# Patient Record
Sex: FEMALE | Race: OTHER | HISPANIC OR LATINO | URBAN - METROPOLITAN AREA
[De-identification: names, ages, dates, MRNs, and addresses within clinical notes are randomized per-mention and may not be internally consistent; named-entity substitution may affect disease eponyms.]

---

## 2024-01-22 ENCOUNTER — EMERGENCY (EMERGENCY)
Facility: HOSPITAL | Age: 24
LOS: 1 days | Discharge: HOME | End: 2024-01-22
Attending: STUDENT IN AN ORGANIZED HEALTH CARE EDUCATION/TRAINING PROGRAM
Payer: COMMERCIAL

## 2024-01-22 VITALS
TEMPERATURE: 98 F | DIASTOLIC BLOOD PRESSURE: 82 MMHG | WEIGHT: 185.19 LBS | SYSTOLIC BLOOD PRESSURE: 129 MMHG | RESPIRATION RATE: 16 BRPM | OXYGEN SATURATION: 97 % | HEART RATE: 99 BPM

## 2024-01-22 PROCEDURE — 99283 EMERGENCY DEPT VISIT LOW MDM: CPT | Mod: 25

## 2024-01-22 PROCEDURE — 90715 TDAP VACCINE 7 YRS/> IM: CPT

## 2024-01-22 PROCEDURE — 99284 EMERGENCY DEPT VISIT MOD MDM: CPT

## 2024-01-22 PROCEDURE — 90471 IMMUNIZATION ADMIN: CPT

## 2024-01-22 RX ORDER — TETANUS TOXOID, REDUCED DIPHTHERIA TOXOID AND ACELLULAR PERTUSSIS VACCINE, ADSORBED 5; 2.5; 8; 8; 2.5 [IU]/.5ML; [IU]/.5ML; UG/.5ML; UG/.5ML; UG/.5ML
0.5 SUSPENSION INTRAMUSCULAR ONCE
Refills: 0 | Status: COMPLETED | OUTPATIENT
Start: 2024-01-22 | End: 2024-01-22

## 2024-01-22 RX ORDER — FAMOTIDINE 10 MG/ML
20 INJECTION INTRAVENOUS ONCE
Refills: 0 | Status: DISCONTINUED | OUTPATIENT
Start: 2024-01-22 | End: 2024-01-23

## 2024-01-22 RX ORDER — SODIUM CHLORIDE 9 MG/ML
1000 INJECTION, SOLUTION INTRAVENOUS ONCE
Refills: 0 | Status: DISCONTINUED | OUTPATIENT
Start: 2024-01-22 | End: 2024-01-23

## 2024-01-22 RX ORDER — ONDANSETRON 8 MG/1
4 TABLET, FILM COATED ORAL ONCE
Refills: 0 | Status: DISCONTINUED | OUTPATIENT
Start: 2024-01-22 | End: 2024-01-23

## 2024-01-22 RX ORDER — ACETAMINOPHEN 500 MG
1000 TABLET ORAL ONCE
Refills: 0 | Status: DISCONTINUED | OUTPATIENT
Start: 2024-01-22 | End: 2024-01-23

## 2024-01-22 RX ADMIN — TETANUS TOXOID, REDUCED DIPHTHERIA TOXOID AND ACELLULAR PERTUSSIS VACCINE, ADSORBED 0.5 MILLILITER(S): 5; 2.5; 8; 8; 2.5 SUSPENSION INTRAMUSCULAR at 23:57

## 2024-01-22 NOTE — ED PROVIDER NOTE - CLINICAL SUMMARY MEDICAL DECISION MAKING FREE TEXT BOX
Liz: 23-year-old female with no pertinent past medical history presents with multiple medical complaints.  Patient states she was hiking yesterday, tripped on a rock, and fell injuring her right knee.  Denies head strike or LOC.  Patient ambulatory afterwards.  Patient also reports nausea and vomiting since this morning.  Denies any fevers, chest pain, shortness of breath, abdominal pain, diarrhea, dysuria, numbness, focal weakness, or rash.  Denies any history of abdominal surgeries in the past.  Patient attributes her vomiting to alcohol use this past weekend. Unknown last TDAP. Physical exam per above. Laceration >36 hours since injury, high risk of infection/poor healing if sutured, will allow to heal by secondary infection. Abdomen nontender. History and exam provide no evidence of pancreatitis, AAA, cholecystitis, choledocholithiasis, cholangitis, mesenteric ischemia, small bowel obstruction, diverticulitis, colitis, appendicitis, or pelvic etiology such as ovarian torsion or ectopic pregnancy. Will obtain labs, provide supportive treatment, PO trial with dispo pending workup.

## 2024-01-22 NOTE — ED PROVIDER NOTE - DATE/TIME 1
The patient called and stated that she received \"a disturbing VM from [facility] stating that they cannot send her prescription\". I asked the patient if she has made any changes to her clinical information. She said that she no longer uses [facility]. Her current pharmacy is [address] [place] [address]    She asks that Yajaira Page NP resend her 10/20/23 medication request to [address] [place] [address].     The below address is from the patient's bill, I let her know that there is most likely a difference in the addesses due to billin[address] [place] [address], [address] [place] [address] 23-Jan-2024 00:13

## 2024-01-22 NOTE — ED PROVIDER NOTE - PROGRESS NOTE DETAILS
Liz: pt seen and re-evaluated at bedside. Pt comfortable in NAD. Patient declining labs/meds, tolerating PO. Wound cleansed with NS and covered with dressing. Discussed PMD follow up, strict return precautions, pt understood and agreeable with plan.

## 2024-01-22 NOTE — ED PROVIDER NOTE - PATIENT PORTAL LINK FT
You can access the FollowMyHealth Patient Portal offered by [facility] by registering at the following website: [email or web address]. By joining [employer]’s FollowMyHealth portal, you will also be able to view your health information using other applications (apps) compatible with our system.

## 2024-01-22 NOTE — ED ADULT TRIAGE NOTE - CHIEF COMPLAINT QUOTE
I fell yesterday while hiking and I got laceration on my right leg and I have been vomiting and unable to keep anything down

## 2024-01-22 NOTE — ED PROVIDER NOTE - OBJECTIVE STATEMENT
23-year-old female with no pertinent past medical history presents with multiple medical complaints.  Patient states she was hiking yesterday, tripped on a rock, and fell injuring her right knee.  Denies head strike or LOC.  Patient ambulatory afterwards.  Patient also reports nausea and vomiting since this morning.  Denies any fevers, chest pain, shortness of breath, abdominal pain, diarrhea, dysuria, numbness, focal weakness, or rash.  Denies any history of abdominal surgeries in the past.  Patient attributes her vomiting to alcohol use this past weekend. Unknown last TDAP. Denies any additional complaints.

## 2024-01-22 NOTE — ED PROVIDER NOTE - PHYSICAL EXAMINATION
CONSTITUTIONAL: non-toxic, well appearing  SKIN: no rash, no petechiae.  EYES: pink conjunctiva, anicteric  HEENT: NC/AT  NECK: Supple; no meningismus, no JVD  CARD: RRR, no murmurs, equal radial pulses bilaterally 2+  RESP: CTAB, no respiratory distress  ABD: Soft, non-tender, non-distended, no peritoneal signs, no CVA tenderness  EXT: Normal ROM x4. No edema. Right knee linear laceration inferior to right knee, site C/D/I, no active bleeding, FROM knee.   NEURO: Alert, oriented. Neuro exam nonfocal, equal strength bilaterally  PSYCH: Cooperative, appropriate.

## 2024-01-22 NOTE — ED PROVIDER NOTE - NSFOLLOWUPINSTRUCTIONS_ED_ALL_ED_FT
Laceration    A laceration is a cut that goes through all of the layers of the skin and into the tissue that is right under the skin. Some lacerations heal on their own. Others need to be closed with skin adhesive strips, skin glue, stitches (sutures), or staples. Proper laceration care minimizes the risk of infection and helps the laceration to heal better.  If non-absorbable stitches or staples have been placed, they must be taken out within the time frame instructed by your healthcare provider.    Take over the counter acetaminophen (Tylenol) 650-1000 mg every 4-6 hours as needed for pain. Do not take more than 3000 mg in a 24 hour period. Be aware many over the counter and prescription medications also contain acetaminophen (Tylenol).     Take over the counter ibuprofen 400-600 mg every 6 hours with food as needed for pain.  Do not take these medications if you do not have pain or if you have any history of bleeding disorder or, kidney disease. Do not use ibuprofen if you are on blood thinners (anti-coagulation).    Take prescription Augmentin twice daily for 7 days.     SEEK IMMEDIATE MEDICAL CARE IF YOU HAVE ANY OF THE FOLLOWING SYMPTOMS: swelling around the wound, worsening pain, drainage from the wound, red streaking going away from your wound, inability to move finger or toe near the laceration, or discoloration of skin near the laceration.

## 2024-01-23 VITALS
SYSTOLIC BLOOD PRESSURE: 121 MMHG | TEMPERATURE: 98 F | HEART RATE: 67 BPM | DIASTOLIC BLOOD PRESSURE: 82 MMHG | RESPIRATION RATE: 15 BRPM | OXYGEN SATURATION: 96 %
